# Patient Record
Sex: MALE | URBAN - METROPOLITAN AREA
[De-identification: names, ages, dates, MRNs, and addresses within clinical notes are randomized per-mention and may not be internally consistent; named-entity substitution may affect disease eponyms.]

---

## 2021-12-20 ENCOUNTER — OFFICE VISIT (OUTPATIENT)
Dept: URBAN - METROPOLITAN AREA CLINIC 19 | Facility: CLINIC | Age: 56
End: 2021-12-20

## 2024-07-16 ENCOUNTER — DASHBOARD ENCOUNTERS (OUTPATIENT)
Age: 59
End: 2024-07-16

## 2024-07-16 ENCOUNTER — OFFICE VISIT (OUTPATIENT)
Dept: URBAN - METROPOLITAN AREA CLINIC 19 | Facility: CLINIC | Age: 59
End: 2024-07-16
Payer: COMMERCIAL

## 2024-07-16 VITALS
HEART RATE: 89 BPM | SYSTOLIC BLOOD PRESSURE: 120 MMHG | DIASTOLIC BLOOD PRESSURE: 80 MMHG | HEIGHT: 73 IN | WEIGHT: 179.2 LBS | BODY MASS INDEX: 23.75 KG/M2 | TEMPERATURE: 98.6 F

## 2024-07-16 DIAGNOSIS — R12 HEARTBURN: ICD-10-CM

## 2024-07-16 PROCEDURE — 99203 OFFICE O/P NEW LOW 30 MIN: CPT | Performed by: NURSE PRACTITIONER

## 2024-07-16 PROCEDURE — 99243 OFF/OP CNSLTJ NEW/EST LOW 30: CPT | Performed by: NURSE PRACTITIONER

## 2024-07-16 RX ORDER — SUCRALFATE 1 G/1
1 TABLET ON AN EMPTY STOMACH, MIX WITH 2 TABLESPOONS OF WATER TO MAKE A SLURRY TABLET ORAL TWICE A DAY
Qty: 60 | Refills: 0 | OUTPATIENT
Start: 2024-07-16 | End: 2024-08-15

## 2024-07-16 NOTE — HPI-TODAY'S VISIT:
Mr. Machado is a 57 yo male with PMH of DM2, RLS who presents for heartburn. Sent upon referral from Dr. Stuart Montgomery. A copy of this report will be sent to the referring provider. Referred to Dr. Rondon  He reports onset of severe heartburn 3 months ago. He was started on Pantoprazole and Famotidine which helped only a little. He is still having issue. Wants to know waht else he can take to help. He feels he eats a good diet. Does not eat spicy food. No abdominal pain. No dysphagia. No N/V Bowels are regular. No bloody or black stools. No family hx of GI or colon cancer.  Colonoscopy age 50 was normal done at [facility]  No cardio/pulm/kidney disease. Not on any blood thinners.

## 2024-07-16 NOTE — PHYSICAL EXAM GASTROINTESTINAL
Abdomen , soft, nontender, nondistended , no guarding or rigidity , no masses palpable ,Liver and Spleen,  no hepatosplenomegaly

## 2024-07-17 ENCOUNTER — TELEPHONE ENCOUNTER (OUTPATIENT)
Dept: URBAN - METROPOLITAN AREA CLINIC 19 | Facility: CLINIC | Age: 59
End: 2024-07-17

## 2024-07-23 ENCOUNTER — OFFICE VISIT (OUTPATIENT)
Dept: URBAN - METROPOLITAN AREA SURGERY CENTER 31 | Facility: SURGERY CENTER | Age: 59
End: 2024-07-23
Payer: COMMERCIAL

## 2024-07-23 DIAGNOSIS — K29.70 CHRONIC GASTRITIS: ICD-10-CM

## 2024-07-23 DIAGNOSIS — K20.80 ABSCESS OF ESOPHAGUS: ICD-10-CM

## 2024-07-23 DIAGNOSIS — K29.60 ADENOPAPILLOMATOSIS GASTRICA: ICD-10-CM

## 2024-07-23 PROCEDURE — 00731 ANES UPR GI NDSC PX NOS: CPT | Performed by: NURSE ANESTHETIST, CERTIFIED REGISTERED

## 2024-07-23 PROCEDURE — 43239 EGD BIOPSY SINGLE/MULTIPLE: CPT | Performed by: INTERNAL MEDICINE

## 2024-07-23 RX ORDER — SUCRALFATE 1 G/1
1 TABLET ON AN EMPTY STOMACH, MIX WITH 2 TABLESPOONS OF WATER TO MAKE A SLURRY TABLET ORAL TWICE A DAY
Qty: 60 | Refills: 0 | Status: ACTIVE | COMMUNITY
Start: 2024-07-16 | End: 2024-08-15

## 2024-07-25 ENCOUNTER — WEB ENCOUNTER (OUTPATIENT)
Dept: URBAN - METROPOLITAN AREA CLINIC 20 | Facility: CLINIC | Age: 59
End: 2024-07-25

## 2024-07-25 RX ORDER — OMEPRAZOLE 40 MG/1
1 CAPSULE 30 MINUTES BEFORE MORNING MEAL CAPSULE, DELAYED RELEASE ORAL ONCE A DAY
Qty: 30 | OUTPATIENT
Start: 2024-07-26

## 2024-08-20 ENCOUNTER — TELEPHONE ENCOUNTER (OUTPATIENT)
Dept: URBAN - METROPOLITAN AREA CLINIC 19 | Facility: CLINIC | Age: 59
End: 2024-08-20

## 2024-08-20 ENCOUNTER — OFFICE VISIT (OUTPATIENT)
Dept: URBAN - METROPOLITAN AREA CLINIC 19 | Facility: CLINIC | Age: 59
End: 2024-08-20

## 2024-08-20 RX ORDER — SUCRALFATE 1 G/1
1 TABLET ON AN EMPTY STOMACH, MIX WITH 2 TABLESPOONS OF WATER TO MAKE A SLURRY TABLET ORAL TWICE A DAY
Qty: 60 | Refills: 0
Start: 2024-07-16 | End: 2024-09-19

## 2024-08-20 RX ORDER — OMEPRAZOLE 40 MG/1
1 CAPSULE 30 MINUTES BEFORE MORNING MEAL CAPSULE, DELAYED RELEASE ORAL ONCE A DAY
Qty: 30 | Status: ACTIVE | COMMUNITY
Start: 2024-07-26

## 2024-08-20 NOTE — HPI-TODAY'S VISIT:
Mr. Machado is a 57 yo male with PMH of DM2, RLS who presents for procedure follow-up.   He was last seen in GI clinic on  7/16/2024 He underwent EGD with Dr. Rondon on 7/23/2024.  Normal esophagus, erythematous mucosa in the antrum.  Normal duodenum.  Path: Stomach antrum-slight chronic gastritis negative for H. pylori.  Middle and lower third esophagus biopsies were negative for Aguiar's or EOE He was given omeprazole 40 mg/day   heartburn. Sent upon referral from Dr. Stuart Montgomery. A copy of this report will be sent to the referring provider. Referred to Dr. Rondon  He reports onset of severe heartburn 3 months ago. He was started on Pantoprazole and Famotidine which helped only a little. He is still having issue. Wants to know waht else he can take to help. He feels he eats a good diet. Does not eat spicy food. No abdominal pain. No dysphagia. No N/V Bowels are regular. No bloody or black stools. No family hx of GI or colon cancer.  Colonoscopy age 50 was normal done at [facility].  No cardio/pulm/kidney disease. Not on any blood thinners.   His last colonoscopy was performed by Dr. White on 9/26/2016-normal; diverticulosis. 10-year follow-up given

## 2024-08-30 ENCOUNTER — OFFICE VISIT (OUTPATIENT)
Dept: URBAN - METROPOLITAN AREA CLINIC 19 | Facility: CLINIC | Age: 59
End: 2024-08-30
Payer: COMMERCIAL

## 2024-08-30 VITALS
WEIGHT: 178.2 LBS | HEART RATE: 94 BPM | HEIGHT: 73 IN | TEMPERATURE: 98.6 F | BODY MASS INDEX: 23.62 KG/M2 | SYSTOLIC BLOOD PRESSURE: 130 MMHG | DIASTOLIC BLOOD PRESSURE: 90 MMHG

## 2024-08-30 DIAGNOSIS — K21.9 GERD: ICD-10-CM

## 2024-08-30 PROCEDURE — 99213 OFFICE O/P EST LOW 20 MIN: CPT | Performed by: NURSE PRACTITIONER

## 2024-08-30 RX ORDER — OMEPRAZOLE 40 MG/1
1 CAPSULE 30 MINUTES BEFORE MORNING MEAL CAPSULE, DELAYED RELEASE ORAL ONCE A DAY
Qty: 90 | Refills: 3
Start: 2024-07-26

## 2024-08-30 RX ORDER — FAMOTIDINE 40 MG/1
1 TABLET AT BEDTIME TABLET, FILM COATED ORAL ONCE A DAY
Qty: 90 TABLET | Refills: 3 | OUTPATIENT
Start: 2024-08-30

## 2024-08-30 RX ORDER — OMEPRAZOLE 40 MG/1
1 CAPSULE 30 MINUTES BEFORE MORNING MEAL CAPSULE, DELAYED RELEASE ORAL ONCE A DAY
Qty: 30 | Status: ACTIVE | COMMUNITY
Start: 2024-07-26

## 2024-08-30 NOTE — HPI-TODAY'S VISIT:
Mr. Machado is a 57 yo male with PMH of DM2, RLS who presents for procedure follow-up.   He was last seen in GI clinic on 7/16/2024 He underwent EGD with Dr. Rondon on 7/23/2024. Normal esophagus, erythematous mucosa in the antrum. Normal duodenum. Path: Stomach antrum-slight chronic gastritis negative for H. pylori. Middle and lower third esophagus biopsies were negative for Aguiar's or EOE.  He has been taking Omeprazole 40 mg/day and Famotidine at night and been doing very well with this, needs refill.    No family hx of GI or colon cancer. His last colonoscopy was performed by Dr. White on 9/26/2016-normal; diverticulosis. 10-year follow-up given..

## 2024-09-01 ENCOUNTER — P2P PATIENT RECORD (OUTPATIENT)
Age: 59
End: 2024-09-01

## 2024-09-27 ENCOUNTER — TELEPHONE ENCOUNTER (OUTPATIENT)
Dept: URBAN - METROPOLITAN AREA CLINIC 19 | Facility: CLINIC | Age: 59
End: 2024-09-27

## 2024-09-27 RX ORDER — SUCRALFATE 1 G/1
1 TABLET ON AN EMPTY STOMACH, MIX WITH 2 TABLESPOONS OF WATER TO MAKE A SLURRY TABLET ORAL TWICE A DAY
Qty: 60 | Refills: 0
Start: 2024-07-16 | End: 2024-10-27

## 2025-02-28 ENCOUNTER — TELEPHONE ENCOUNTER (OUTPATIENT)
Dept: URBAN - METROPOLITAN AREA CLINIC 19 | Facility: CLINIC | Age: 60
End: 2025-02-28

## 2025-02-28 RX ORDER — SUCRALFATE 1 G/1
1 TABLET ON AN EMPTY STOMACH, MIX WITH 2 TABLESPOONS OF WATER TO MAKE A SLURRY TABLET ORAL TWICE A DAY
Qty: 60 | Refills: 0
Start: 2024-07-16 | End: 2025-03-30

## 2025-08-06 ENCOUNTER — TELEPHONE ENCOUNTER (OUTPATIENT)
Dept: URBAN - METROPOLITAN AREA CLINIC 19 | Facility: CLINIC | Age: 60
End: 2025-08-06

## 2025-08-06 RX ORDER — FAMOTIDINE 40 MG/1
1 TABLET AT BEDTIME TABLET, FILM COATED ORAL ONCE A DAY
Qty: 90 TABLET | Refills: 3
Start: 2024-08-30

## 2025-08-29 ENCOUNTER — OFFICE VISIT (OUTPATIENT)
Dept: URBAN - METROPOLITAN AREA CLINIC 19 | Facility: CLINIC | Age: 60
End: 2025-08-29

## 2025-08-29 RX ORDER — OMEPRAZOLE 40 MG/1
1 CAPSULE 30 MINUTES BEFORE MORNING MEAL CAPSULE, DELAYED RELEASE ORAL ONCE A DAY
Qty: 90 | Refills: 3 | Status: ACTIVE | COMMUNITY
Start: 2024-07-26

## 2025-08-29 RX ORDER — FAMOTIDINE 40 MG/1
1 TABLET AT BEDTIME TABLET, FILM COATED ORAL ONCE A DAY
Qty: 90 TABLET | Refills: 3 | Status: ACTIVE | COMMUNITY
Start: 2024-08-30